# Patient Record
(demographics unavailable — no encounter records)

---

## 2017-08-12 NOTE — CR
Clinical history: 51-year-old female chest pain reported February 2014 (same symptomatology) "no acu
te intrathoracic finding"). Reevaluate please.

 

Interpretation: Upright AP portable chest unremarkable.

 

Normal cardiac silhouette and bony thorax. No cephalization of vascular flow, alveolar edema or depe
ndent effusion.

 

No new lung mass, hilar lymphadenopathy or focal lobar pneumonia identified in the interval since 24 February 2014 exam.

 

No atelectasis/collapse. No pneumothorax.

 

Chronic hypertrophic arthritic changes mildly scoliotic dorsal spine.

## 2017-08-12 NOTE — EDM.PDOC
Scribed by Precious Garner 08/12/17 1324 for Twin Engel MD





ED HPI GENERAL MEDICAL PROBLEM





- General


Chief Complaint: Chest Pain


Stated Complaint: DON'T FEEL GOOD CHEST PAIN ?[id number]


Time Seen by Provider: 08/12/17 11:45


Source of Information: Reports: Patient, RN, RN Notes Reviewed


History Limitations: Reports: No Limitations





- History of Present Illness


INITIAL COMMENTS - FREE TEXT/NARRATIVE: 


Arrives from work by POV with complaint of chest pain which began shortly after 

awakening this morning. Denies any shortness of breath. Admits to pain that 

radiates up the midline to the chest into the neck. Admits to anxiety with 

slight nausea. Patient believes the symptoms may be related to a Medrol Dose 

Ravi that she took, because she had similar symptoms in the past when she took 

steroids. She admits to epigastric discomfort and heartburn. Denies vomiting, 

fever and chills. 


Onset: Today


Location: Reports: Chest


Severity: Moderate


Improves with: Reports: None


Worsens with: Reports: None


Associated Symptoms: Reports: No Other Symptoms





- Related Data


 Allergies











Allergy/AdvReac Type Severity Reaction Status Date / Time


 


acetaminophen [From Tylenol] Allergy  Rash Verified 07/11/15 03:30


 


amoxicillin [Amoxicillin] Allergy  Rash Verified 07/11/15 03:30


 


paroxetine HCl [From Paxil] Allergy  Rash Verified 07/11/15 03:30


 


bleach Allergy  Respiratory Uncoded 07/11/15 03:30





   Distress  











Home Meds: 


 Home Meds





ALPRAZolam [Xanax] 0.5 mg PO ASDIRECTED PRN 02/24/14 [History]


Albuterol [Proventil HFA] 2 puff INH Q4H PRN 02/24/14 [History]


Fluticasone/Salmeterol [Advair 250-50 Diskus] 1 puff INH DAILY 02/24/14 [History

]


Aspirin 81 mg PO DAILY 01/24/16 [History]











Past Medical History


Cardiovascular History: Reports: Other (See Below)


Other Cardiovascular History: Had some heart test.  Results unknow at this time.


Respiratory History: Reports: COPD


Other Genitourinary History: stress incontinence


Psychiatric History: Reports: Depression





Social & Family History





- Family History


Family Medical History: Noncontributory





- Tobacco Use


Smoking Status *Q: Current Every Day Smoker


Years of Tobacco use: 30


Used Tobacco, but Quit: No


Second Hand Smoke Exposure: Yes





- Alcohol Use


Days Per Week of Alcohol Use: 0


Number of Drinks Per Day: 3


Total Drinks Per Week: 0





- Recreational Drug Use


Recreational Drug Use: No


Drug Use in Last 12 Months: No





ED ROS GENERAL





- Review of Systems


Review Of Systems: ROS reveals no pertinent complaints other than HPI.





ED EXAM, GENERAL





- Physical Exam


Exam: See Below


Exam Limited By: No Limitations


General Appearance: Anxious


Eye Exam: Bilateral Eye: Normal Inspection


Ears: Normal External Exam, Normal Canal, Hearing Grossly Normal, Normal TMs


Nose: Normal Inspection, Normal Mucosa, No Blood


Throat/Mouth: Normal Inspection, Normal Lips, Normal Teeth, Normal Gums, Normal 

Oropharynx, Normal Voice, No Airway Compromise


Head: Atraumatic, Normocephalic


Neck: Normal Inspection, Supple, Non-Tender, Full Range of Motion


Respiratory/Chest: No Respiratory Distress, Lungs Clear, Normal Breath Sounds, 

No Accessory Muscle Use, Chest Non-Tender


Cardiovascular: Normal Peripheral Pulses, Regular Rate, Rhythm, No Edema, No 

Gallop, No JVD, No Murmur, No Rub


GI/Abdominal: Normal Bowel Sounds, Soft, Non-Tender, No Organomegaly, No 

Distention, No Abnormal Bruit, No Mass


 (Female) Exam: Deferred


Rectal (Female) Exam: Deferred


Back Exam: Normal Inspection, Full Range of Motion, NT


Extremities: Normal Inspection, Normal Range of Motion, Non-Tender, Normal 

Capillary Refill, No Pedal Edema


Neurological: Alert, Oriented, CN II-XII Intact, Normal Cognition, Normal Gait, 

Normal Reflexes, No Motor/Sensory Deficits


Psychiatric: Anxious


Skin Exam: Warm, Dry, Intact, Normal Color, No Rash


Lymphatic: No Adenopathy





EKG INTERPRETATION


EKG Date: 08/12/17


Time: 12:03


Rhythm: Other (sinus rhythm)


Rate (Beats/Min): 82


Axis: Normal


P-Wave: Present


QRS: Normal


ST-T: Normal


QT: Normal





Course





- Vital Signs


Last Recorded V/S: 


 Last Vital Signs











Temp  36.6 C   08/12/17 11:56


 


Pulse  88   08/12/17 11:56


 


Resp  16   08/12/17 11:56


 


BP  149/93 H  08/12/17 11:56


 


Pulse Ox  98   08/12/17 11:56














- Orders/Labs/Meds


Orders: 


 Active Orders 24 hr











 Category Date Time Status


 


 EKG 12 Lead [EKG Documentation Completion] [] STAT Care  08/12/17 11:42 

Active


 


 Peripheral IV Care [RC] .AS DIRECTED Care  08/12/17 11:43 Active


 


 Sodium Chloride 0.9% [Saline Flush] Med  08/12/17 11:42 Active





 10 ml FLUSH ASDIRECTED PRN   


 


 Peripheral IV Insertion Adult [OM.PC] Stat Oth  08/12/17 11:42 Ordered








 Medication Orders





Sodium Chloride (Saline Flush)  10 ml FLUSH ASDIRECTED PRN


   PRN Reason: Keep Vein Open








Labs: 


 Laboratory Tests











  08/12/17 08/12/17 08/12/17 Range/Units





  12:00 12:00 12:00 


 


WBC  12.0 H    (5.0-10.0)  10^3/uL


 


RBC  4.83    (4.2-5.4)  10^6/uL


 


Hgb  14.2    (12.0-16.0)  g/dL


 


Hct  41.7    (37.0-47.0)  %


 


MCV  86.3    ()  fL


 


MCH  29.4    (27.0-34.0)  pg


 


MCHC  34.1    (33.0-35.0)  g/dL


 


Plt Count  254    (150-450)  10^3/uL


 


Neut % (Auto)  56.5    (42.2-75.2)  %


 


Lymph % (Auto)  34.5    (20.5-50.1)  %


 


Mono % (Auto)  6.6    (2-8)  %


 


Eos % (Auto)  1.9    (1.0-3.0)  %


 


Baso % (Auto)  0.5    (0.0-1.0)  %


 


D-Dimer, Quantitative   < 100   (0-400)  ng/mL


 


Sodium    141  (135-145)  mmol/L


 


Potassium    4.4  (3.6-5.0)  mmol/L


 


Chloride    102  (101-111)  mmol/L


 


Carbon Dioxide    29.0  (21.0-31.0)  mmol/L


 


Anion Gap    14.4  


 


BUN    20 H  (7-18)  mg/dL


 


Creatinine    0.6  (0.6-1.3)  mg/dL


 


Est Cr Clr Drug Dosing    99.82  mL/min


 


Estimated GFR (MDRD)    > 60  


 


BUN/Creatinine Ratio    33.33  


 


Glucose    114 H  ()  mg/dL


 


Calcium    9.2  (8.4-10.2)  mg/dl


 


Total Bilirubin    0.5  (0.2-1.0)  mg/dL


 


AST    21  (10-42)  IU/L


 


ALT    22  (10-60)  IU/L


 


Alkaline Phosphatase    52  ()  IU/L


 


Total Protein    7.0  (6.7-8.2)  g/dl


 


Albumin    4.1  (3.2-5.5)  g/dl


 


Globulin    2.9  


 


Albumin/Globulin Ratio    1.41  


 


Amylase    46  ()  U/L


 


Lipase    29  (22-51)  U/L


 


Urine Color     (YELLOW)  


 


Urine Appearance     (CLEAR)  


 


Urine pH     (5.0-9.0)  


 


Ur Specific Gravity     (1.005-1.030)  


 


Urine Protein     (NEGATIVE)  


 


Urine Glucose (UA)     (NEGATIVE)  


 


Urine Ketones     (NEGATIVE)  


 


Urine Occult Blood     (NEGATIVE)  


 


Urine Nitrite     (NEGATIVE)  


 


Urine Bilirubin     (NEGATIVE)  


 


Urine Urobilinogen     (0.2-1.0)  mg/dL


 


Ur Leukocyte Esterase     (NEGATIVE)  


 


Urine RBC     /HPF


 


Urine WBC     (0-5/HPF)  /HPF


 


Ur Epithelial Cells     /HPF


 


Urine Bacteria     (0-FEW/HPF)  /HPF


 


Urine Mucus     /LPF


 


Urine Opiates Screen     (NEGATIVE)  


 


Ur Oxycodone Screen     (NEGATIVE)  


 


Urine Methadone Screen     (NEGATIVE)  


 


Ur Barbiturates Screen     (NEGATIVE)  


 


U Tricyclic Antidepress     (NEGATIVE)  


 


Ur Phencyclidine Scrn     (NEGATIVE)  


 


Ur Amphetamine Screen     (NEGATIVE)  


 


U Methamphetamines Scrn     (NEGATIVE)  


 


Urine MDMA Screen     (NEGATIVE)  


 


U Benzodiazepines Scrn     (NEGATIVE)  


 


Urine Cocaine Screen     (NEGATIVE)  


 


U Marijuana (THC) Screen     (NEGATIVE)  














  08/12/17 08/12/17 Range/Units





  12:18 12:18 


 


WBC    (5.0-10.0)  10^3/uL


 


RBC    (4.2-5.4)  10^6/uL


 


Hgb    (12.0-16.0)  g/dL


 


Hct    (37.0-47.0)  %


 


MCV    ()  fL


 


MCH    (27.0-34.0)  pg


 


MCHC    (33.0-35.0)  g/dL


 


Plt Count    (150-450)  10^3/uL


 


Neut % (Auto)    (42.2-75.2)  %


 


Lymph % (Auto)    (20.5-50.1)  %


 


Mono % (Auto)    (2-8)  %


 


Eos % (Auto)    (1.0-3.0)  %


 


Baso % (Auto)    (0.0-1.0)  %


 


D-Dimer, Quantitative    (0-400)  ng/mL


 


Sodium    (135-145)  mmol/L


 


Potassium    (3.6-5.0)  mmol/L


 


Chloride    (101-111)  mmol/L


 


Carbon Dioxide    (21.0-31.0)  mmol/L


 


Anion Gap    


 


BUN    (7-18)  mg/dL


 


Creatinine    (0.6-1.3)  mg/dL


 


Est Cr Clr Drug Dosing    mL/min


 


Estimated GFR (MDRD)    


 


BUN/Creatinine Ratio    


 


Glucose    ()  mg/dL


 


Calcium    (8.4-10.2)  mg/dl


 


Total Bilirubin    (0.2-1.0)  mg/dL


 


AST    (10-42)  IU/L


 


ALT    (10-60)  IU/L


 


Alkaline Phosphatase    ()  IU/L


 


Total Protein    (6.7-8.2)  g/dl


 


Albumin    (3.2-5.5)  g/dl


 


Globulin    


 


Albumin/Globulin Ratio    


 


Amylase    ()  U/L


 


Lipase    (22-51)  U/L


 


Urine Color   Yellow  (YELLOW)  


 


Urine Appearance   Slightly cloudy  (CLEAR)  


 


Urine pH   5.0  (5.0-9.0)  


 


Ur Specific Gravity   1.020  (1.005-1.030)  


 


Urine Protein   Negative  (NEGATIVE)  


 


Urine Glucose (UA)   Negative  (NEGATIVE)  


 


Urine Ketones   Negative  (NEGATIVE)  


 


Urine Occult Blood   Trace-intact H  (NEGATIVE)  


 


Urine Nitrite   Negative  (NEGATIVE)  


 


Urine Bilirubin   Negative  (NEGATIVE)  


 


Urine Urobilinogen   0.2  (0.2-1.0)  mg/dL


 


Ur Leukocyte Esterase   Negative  (NEGATIVE)  


 


Urine RBC   5-10 H  /HPF


 


Urine WBC   0-5  (0-5/HPF)  /HPF


 


Ur Epithelial Cells   Few  /HPF


 


Urine Bacteria   Rare  (0-FEW/HPF)  /HPF


 


Urine Mucus   Rare  /LPF


 


Urine Opiates Screen  Negative   (NEGATIVE)  


 


Ur Oxycodone Screen  Negative   (NEGATIVE)  


 


Urine Methadone Screen  Negative   (NEGATIVE)  


 


Ur Barbiturates Screen  Negative   (NEGATIVE)  


 


U Tricyclic Antidepress  Negative   (NEGATIVE)  


 


Ur Phencyclidine Scrn  Negative   (NEGATIVE)  


 


Ur Amphetamine Screen  Negative   (NEGATIVE)  


 


U Methamphetamines Scrn  Negative   (NEGATIVE)  


 


Urine MDMA Screen  Negative   (NEGATIVE)  


 


U Benzodiazepines Scrn  Negative   (NEGATIVE)  


 


Urine Cocaine Screen  Negative   (NEGATIVE)  


 


U Marijuana (THC) Screen  Negative   (NEGATIVE)  











Meds: 


Medications











Generic Name Dose Route Start Last Admin





  Trade Name Freq  PRN Reason Stop Dose Admin


 


Sodium Chloride  10 ml  08/12/17 11:42  





  Saline Flush  FLUSH   





  ASDIRECTED PRN   





  Keep Vein Open   














Discontinued Medications














Generic Name Dose Route Start Last Admin





  Trade Name Freq  PRN Reason Stop Dose Admin


 


Aspirin  324 mg  08/12/17 11:50  08/12/17 12:37





  Aspirin  PO  08/12/17 11:51  324 mg





  ONETIME ONE   Administration














- Radiology Interpretation


Free Text/Narrative:: 


Chest x-ray: No acute changes. Per rad report. 





Departure





- Departure


Time of Disposition: 13:10


Disposition: Home, Self-Care 01


Condition: Good


Clinical Impression: 


 Atypical chest pain, Anxiety disorder





Instructions:  Nonspecific Chest Pain, Easy-to-Read


Forms:  ED Department Discharge


Additional Instructions: 


Follow up in the clinic in the next 3 to 5 days if not improved.


Drink plenty of water.


Continue Cephalexin as prescribed. 





- My Orders


Last 24 Hours: 


My Active Orders





08/12/17 11:42


EKG 12 Lead [EKG Documentation Completion] [RC] STAT 


Sodium Chloride 0.9% [Saline Flush]   10 ml FLUSH ASDIRECTED PRN 


Peripheral IV Insertion Adult [OM.PC] Stat 





08/12/17 11:43


Peripheral IV Care [RC] .AS DIRECTED 














- Assessment/Plan


Last 24 Hours: 


My Active Orders





08/12/17 11:42


EKG 12 Lead [EKG Documentation Completion] [RC] STAT 


Sodium Chloride 0.9% [Saline Flush]   10 ml FLUSH ASDIRECTED PRN 


Peripheral IV Insertion Adult [OM.PC] Stat 





08/12/17 11:43


Peripheral IV Care [RC] .AS DIRECTED 














I have read and agree with the documentation that has been completed regarding 

this visit. By signing this record, I attest that the documentation was 

completed in my physical presence and is an accurate record of the encounter.

## 2017-08-21 NOTE — EKG
08/12/2017- HAASE, LESA L -

 

This is a standard 12-lead EKG showing normal sinus rhythm with a ventricular 
rate of 82 beats per minute.  Normal [place] interval, QRS duration. No significant 
ST-T changes.  

 

DD:  08/19/2017 14:32:30

DT:  08/19/2017 14:41:34

[facility]

Job #:  [id number]

MTDD